# Patient Record
(demographics unavailable — no encounter records)

---

## 2017-03-21 NOTE — RAD
2 VIEWS OF CHEST:

 

Date:

03/20/17 

 

COMPARISON:  

08/11/16. 

 

HISTORY:  

Atrial fibrillation for 6 months. 

 

FINDINGS:

Single view of the chest shows an enlarged but stable cardiomediastinal silhouette. There is no evid
ence of consolidation or pleural effusion. Calcified granulomas are seen in the left thorax. 

 

IMPRESSION: 

Cardiomegaly without evidence of acute cardiopulmonary disease. 

 

 

POS: [facility]

## 2018-07-06 NOTE — EKG
Test Reason : 

Blood Pressure : ***/*** mmHG

Vent. Rate : 057 BPM     Atrial Rate : 057 BPM

   P-R Int : 212 ms          QRS Dur : 130 ms

    QT Int : 480 ms       P-R-T Axes : 029 -35 034 degrees

   QTc Int : 467 ms

 

Sinus bradycardia with 1st degree A-V block

Left axis deviation

Right bundle branch block

Inferior infarct (cited on or before 02-MAY-2017)

Abnormal ECG

When compared with ECG of 02-MAY-2017 08:58,

Right bundle branch block is now Present

Confirmed by DR. LEYLA BAPTISTE (3) on 7/6/2018 5:08:09 PM

 

Referred By:  PAAVN           Confirmed By:DR. LEYLA BAPTISTE

## 2018-07-12 NOTE — HP
HISTORY OF PRESENT ILLNESS:  Patient is a 72-year-old white male with a long history of progressive d
egenerative arthritis of the right hip, unresponsive to conservative treatment including rest, restri
ction of activities, lifestyle adjustments, and previous cortisone injections.  He is admitted at [facility]
s time for total knee replacement.  He has had previous left total knee replacement in 2012 with good
 results.

 

PAST MEDICAL HISTORY:  As noted above.  The patient also has diabetes, hypertension, atherosclerotic 
cardiovascular disease and atrial fibrillation on Xarelto.  He has stopped Xarelto 3 days prior to [facility].

 

CURRENT MEDICATIONS:  His other medications include aspirin which he has continued, Lasix, gabapentin
, lisinopril, Lantus insulin, metformin, Crestor, ____.

 

ALLERGIES:  He is allergic to TRADJENTA, INVOKANA and ACTOS.

 

FAMILY HISTORY:  Otherwise essentially unremarkable.

 

SOCIAL HISTORY:  Otherwise essentially unremarkable.

 

REVIEW OF SYSTEMS:  Otherwise essentially unremarkable.

 

PHYSICAL EXAMINATION:

GENERAL:  Reveals a healthy heavyset male.

HEENT:  Unremarkable.

NECK:  Supple.

CHEST:  Clear.

HEART:  Regular rate and rhythm.

ABDOMEN:  Soft, nontender.

RECTAL/GENITAL:  Deferred.

EXTREMITIES:  ____ the right knee, there is slight puffiness.  There is mild varus.  There is tendern
ess over the medial and lateral joint lines.  Range of motion is 5-105 degrees with crepitus.  There 
is no instability.  There are palpable distal pulses, right antalgic gait.  Neurovascular exam is int
act.

 

X-RAY FINDINGS:  X-rays of the right knee reveal tricompartmental DJD with narrowing medially and lat
erally.

 

IMPRESSION:

1.  Degenerative arthritis, right knee.

2.  Status post left total knee replacement.

3.  Adult-onset diabetes.

4.  Atherosclerotic cardiovascular disease.

5.  Hypertension.

 

PLAN:  Right total knee replacement.  The nature of the surgery, length of recovery, and potential co
mplications such as infection, loss of motion, incomplete relief, thromboembolic phenomena, neurovasc
ular injury, possible transfusion, and need for revision have been discussed in detail with the patie
nt and his wife.

## 2018-07-16 NOTE — OP
DATE OF PROCEDURE:  07/16/2018

 

SURGEON:  Vaughn Pepe M.D.

 

ASSISTANT:  CARLOS Wagoner.

 

ANESTHESIA:  General plus femoral site nerve blocks.

 

PREOPERATIVE DIAGNOSIS:  Degenerative arthritis, right knee.

 

POSTOPERATIVE DIAGNOSIS:  Degenerative arthritis, right knee.

 

PROCEDURES:  Right total knee replacement with cemented Triathlon components with computer-assisted n
avigation (#6 femoral component, #6 universal tibial baseplate with 11 mm CS plastic insert, and A35 
all plastic patellar component).

 

NARRATIVE REPORT:  After satisfactory anesthesia was induced in supine position, sequential compressi
on device was placed on the non-operative leg throughout the procedure.  Right leg was then prepped a
nd draped in routine sterile fashion.  Right leg was elevated, exsanguinated with an Esmarch bandage,
 and the tourniquet inflated to 300 mmHg.  A gently curved medial parapatellar incision was made and 
carried down to subcutaneous tissues, and bleeding points controlled with Bovie cautery.  Medial para
patellar arthrotomy was performed.  Patella was dislocated laterally and portions of fat pad excised 
for exposure.  There was marked tricompartmental degenerative arthritis of the knee.  Meniscal remnan
ts and osteophytes were removed.  Using the Boxcar pinless navigation system and the appropriate gabe
dileep, the distal femoral and proximal tibial articular surfaces were excised with an oscillating saw t
o accept the trial components.  It was felt that #6 femoral component, #6 universal tibial baseplate 
with 11 mm CS plastic insert gave appropriate size, fit, stability, and correction of the preoperativ
e deformity.  The patellar articular surface was excised to accept an all plastic A35 patellar compon
ent.  The trial components removed.  The knee was copiously irrigated with pulsatile lavage and the b
qamar surfaces thoroughly cleaned and dried.  The permanent components were then cemented in a single s
tage using 1 package of cement premixed with 1 gram of tobramycin powder.  Excess cement was removed.
  There was good fit and stability of the component.  The skin was infiltrated with 30 mL of 0.25% Ma
rcaine with epinephrine.  The medial retinaculum and quadriceps mechanism was closed with interrupted
 #2 Vicryl and a running #2 Quill.  Subcutaneous tissues were closed with running 0 Quill suture and 
the skin closed with running subcuticular 3-0 Monoderm and SurgiSeal skin adhesive.  A sterile bulky 
compressive dressing was applied and the tourniquet deflated after 87 minutes.  The foot promptly pin
ked up and sequential compression device was applied to his operated leg.  He was awakened and taken 
to recovery room in stable condition.  There were no apparent intraoperative complications.  The sri
mated blood loss was less than 100 mL.

## 2018-07-16 NOTE — PDOC.PN
- Subjective


Encounter Start Date: 07/16/18


Encounter Start Time: 14:30





Patient seen and examined for med mngt. No CP/SOB. Follows Dr Jackson. 





- Objective


MAR Reviewed: Yes


Vital Signs & Weight: 


 Vital Signs (12 hours)











  Temp Pulse Resp BP BP Pulse Ox


 


 07/16/18 16:00  97.7 F  60  20   120/67  90 L


 


 07/16/18 12:10     131/69  


 


 07/16/18 10:55  96.1 F L  56 L  18   134/73  95








 Weight











Weight                         325 lb














Additional Labs: 


 Accuchecks











  07/16/18 07/16/18 07/16/18





  15:55 12:18 06:29


 


POC Glucose  315 H  199 H  144 H











Radiology Reviewed by me: Yes (Knee XR - DJD)


EKG Reviewed by me: Yes (SB with 1 st deg AV block)





Phys Exam





- Physical Examination


Constitutional: NAD


Respiratory: no wheezing, no rhonchi


Cardiovascular: RRR, no rub


Gastrointestinal: soft, non-tender, positive bowel sounds


Musculoskeletal: no edema


Neurological: moves all 4 limbs





Dx/Plan





- Plan


DVT proph w/SCDs





IMPRESSION:


1. DM2


2. HTN


3. Par Afib on anticoag


4. Morbid obesity BMI 44.1





PLAN:


Xarelto to be restarted in AM at 10 mg daily


Cont Amiodarone


Change Lantus to 25 units BID


Add low dose sliding scale


AM labs


Cont Lisinopril


Cont current meds as below


Full code, DPOA - spouse








Review of Systems





- Review of Systems


Respiratory: negative: Cough, Dry, Shortness of Breath, Hemoptysis, SOB with 

Excertion, Pleuritic Pain, Sputum, Wheezing


Cardiovascular: negative: chest pain, palpitations, orthopnea, paroxysmal 

nocturnal dyspnea, edema, light headedness, other





- Medications/Allergies


Allergies/Adverse Reactions: 


 Allergies











Allergy/AdvReac Type Severity Reaction Status Date / Time


 


No Known Allergies Allergy   Verified 07/06/18 08:45











Medications: 


 Current Medications





Acetaminophen (Tylenol)  650 mg PO Q4H PRN


   PRN Reason: HA/ T > 101F; Mild Pain (1-3)


Hydrocodone Bitart/Acetaminophen (Norco 7.5/325)  1 tab PO Q4H PRN


   PRN Reason: Mild Pain (1-3)


Hydrocodone Bitart/Acetaminophen (Norco 7.5/325)  2 tab PO Q4H PRN


   PRN Reason: Moderate Pain (4-6)


   Last Admin: 07/16/18 17:11 Dose:  2 tab


Amiodarone HCl (Cordarone)  200 mg PO QAM [facility]


Aspirin (Aspirin Chewable)  81 mg PO QAM [facility]


Cefazolin Sodium (Ancef)  2 gm SLOW IVP Q8HR [facility]


   Stop: 07/16/18 22:01


   Last Admin: 07/16/18 17:11 Dose:  2 gm


Cholecalciferol (Vitamin D3)  2,000 units PO QAM [facility]


Dextrose/Water (Dextrose 50%)  25 gm SLOW IVP PRN PRN


   PRN Reason: Hypoglycemia


Diphenhydramine HCl (Benadryl)  25 mg PO Q6H PRN


   PRN Reason: Itching


Fentanyl (Sublimaze)  50 mcg SLOW IVP Q30MIN PRN


   PRN Reason: Moderate Pain (4-6)


   Last Admin: 07/16/18 19:24 Dose:  50 mcg


Fentanyl (Sublimaze)  100 mcg SLOW IVP Q1H PRN


   PRN Reason: Severe Pain (7-10)


Ferrous Gluconate (Fergon)  324 mg PO BID [facility]


Gabapentin (Neurontin)  300 mg PO BID [facility]


Glucagon (Glucagon)  1 mg IM PRN PRN


   PRN Reason: Hypoglycemia


Bupivacaine HCl 50 ml/ Sodium (Chloride)  100 mls @ 8 mls/hr NERVE BLCK INF [facility]


Sodium Chloride (Normal Saline 0.9%)  1,000 mls @ 100 mls/hr IV .Q10H [facility]


   Last Admin: 07/16/18 11:49 Dose:  Not Given


Vancomycin HCl 2 gm/ Sodium (Chloride)  500 mls @ 250 mls/hr IVPB 1800 [facility]


   Stop: 07/16/18 23:59


   Last Admin: 07/16/18 18:00 Dose:  500 mls


Insulin Glargine 25 units/ (Miscellaneous Medication)  0.25 mls @ 0 mls/hr SC 

BID [facility]


Dextrose/Water (D5w)  1,000 mls @ 0 mls/hr IV .Q0M PRN; As Directed


   PRN Reason: Hypoglycemia


Insulin Human Regular (Humulin R)  0 units SC .MILD SLIDING SCALE PRN


   PRN Reason: Mild Correctional Scale


Insulin Human Regular (Humulin R)  0 units SC .BEDTIME SLIDING SC PRN


   PRN Reason: Bedtime Correctional Scale


Iron/Minerals/Multivitamins (Theragran M)  1 tab PO DAILY [facility]


Ketorolac Tromethamine (Toradol)  15 mg IVP Q6H PRN


   PRN Reason: Moderate Pain (4-6)


   Stop: 07/19/18 09:47


   Last Admin: 07/16/18 19:21 Dose:  15 mg


Lisinopril (Zestril)  20 mg PO QAM [facility]


Metformin HCl (Glucophage Xr)  1,000 mg PO BID [facility]


Ondansetron HCl (Zofran)  4 mg IVP Q6H PRN


   PRN Reason: Nausea/Vomiting


Oxcarbazepine (Trileptal)  300 mg PO BID [facility]


Promethazine HCl (Phenergan)  12.5 mg IM Q4H PRN


   PRN Reason: Nausea


Promethazine HCl (Phenergan)  12.5 mg SLOW IVP Q4H PRN


   PRN Reason: Nausea/Vomiting


Rivaroxaban (Xarelto)  10 mg PO DAILY [facility]


Rosuvastatin Calcium (Crestor)  10 mg PO HS [facility]


Senna/Docusate Sodium (Senokot S)  2 tab PO BID [facility]


Sodium Chloride (Flush - Normal Saline)  10 ml IVF PRN PRN


   PRN Reason: Saline Flush


Tramadol HCl (Ultram)  50 mg PO Q6H PRN


   PRN Reason: Mild Pain (1-3)


Tramadol HCl (Ultram)  100 mg PO Q6H PRN


   PRN Reason: Mild Pain (1-3)


Zolpidem Tartrate (Ambien)  5 mg PO HSPRN PRN


   PRN Reason: Insomnia

## 2018-07-16 NOTE — RAD
TWO VIEWS RIGHT KNEE:

 

DATE: 7/16/18.

 

HISTORY: 

Evaluate knee following total knee arthroplasty.

 

FINDINGS: 

There is evidence of recent total knee arthroplasty, with postoperative gas in the region of the knee
 joint, particularly superiorly.  No evidence for hardware failure.  No displaced fracture or disloca
tion.  Lateral imaging demonstrates gas and fluid within the suprapatellar bursa.

 

IMPRESSION: 

Postoperative changes as described above, evidence of recent total knee arthroplasty.

 

POS: [facility]

## 2018-07-17 NOTE — PDOC.PN
- Subjective


Encounter Start Date: 07/17/18


Encounter Start Time: 13:30





Patient seen and examined for med mngt. Pain controlled. No CP/SOB. No new 

complaints. No overnight events





- Objective


MAR Reviewed: Yes


Vital Signs & Weight: 


 Vital Signs (12 hours)











  Temp Pulse Resp BP BP Pulse Ox


 


 07/17/18 15:31  98.4 F  72  18   147/73 H  95


 


 07/17/18 11:39  98.5 F  68  20   130/70  95


 


 07/17/18 08:24     133/65  


 


 07/17/18 08:20  98.3 F  73  20    91 L


 


 07/17/18 08:07  98.3 F  73  20   133/65  94 L








 Weight











Admit Weight                   325 lb


 


Weight                         325 lb














I&O: 


 











 07/16/18 07/17/18 07/18/18





 06:59 06:59 06:59


 


Intake Total  3143 


 


Output Total  1100 


 


Balance  2043 











Result Diagrams: 


 07/17/18 05:44





 07/17/18 07:16


Additional Labs: 


 Accuchecks











  07/17/18 07/17/18 07/17/18





  16:14 11:40 06:08


 


POC Glucose  192 H  178 H  169 H














  07/16/18





  21:02


 


POC Glucose  200 H














Phys Exam





- Physical Examination


Constitutional: NAD


Respiratory: no wheezing, no rhonchi


Cardiovascular: RRR, no rub


Gastrointestinal: soft, non-tender, positive bowel sounds


Musculoskeletal: no edema


Neurological: moves all 4 limbs





Dx/Plan





- Plan


plan discussed w/ family, PT/OT, , incentive spirometry, out of 

bed/ambulate, DVT proph w/SCDs





IMPRESSION:


1. DM2 - on sliding scale


2. HTN


3. Par Afib on anticoag


4. Morbid obesity BMI 44.1


5. Hypomagnesemia 1.5


6. RANDI on CPAP





PLAN:


Cont Amiodarone/Xarelto and Lisinopril


Cont Lantus to 25 units BID with low dose sliding scale


Cont other meds as below


Cont to monitor


PT/OT


Add Miralax


Cont CPAP HS








Review of Systems





- Review of Systems


Respiratory: negative: Cough, Dry, Shortness of Breath, Hemoptysis, SOB with 

Excertion, Pleuritic Pain, Sputum, Wheezing


Cardiovascular: negative: chest pain, palpitations, orthopnea, paroxysmal 

nocturnal dyspnea, edema, light headedness, other


Gastrointestinal: Constipation.  negative: Nausea, Vomiting, Abdominal Pain, 

Diarrhea, Melena, Hematochezia, Other





- Medications/Allergies


Allergies/Adverse Reactions: 


 Allergies











Allergy/AdvReac Type Severity Reaction Status Date / Time


 


No Known Allergies Allergy   Verified 07/06/18 08:45











Medications: 


 Current Medications





Acetaminophen (Tylenol)  650 mg PO Q4H PRN


   PRN Reason: HA/ T > 101F; Mild Pain (1-3)


Hydrocodone Bitart/Acetaminophen (Norco 7.5/325)  1 tab PO Q4H PRN


   PRN Reason: Mild Pain (1-3)


Hydrocodone Bitart/Acetaminophen (Norco 7.5/325)  2 tab PO Q4H PRN


   PRN Reason: Moderate Pain (4-6)


   Last Admin: 07/17/18 15:08 Dose:  2 tab


Amiodarone HCl (Cordarone)  100 mg PO BID Counts include 234 beds at the [facility]


   Last Admin: 07/17/18 08:25 Dose:  100 mg


Aspirin (Aspirin Chewable)  81 mg PO [facility]


   Last Admin: 07/17/18 08:25 Dose:  81 mg


Cholecalciferol (Vitamin D3)  2,000 units PO [facility]


   Last Admin: 07/17/18 08:26 Dose:  2,000 units


Dextrose/Water (Dextrose 50%)  25 gm SLOW IVP PRN PRN


   PRN Reason: Hypoglycemia


Diphenhydramine HCl (Benadryl)  25 mg PO Q6H PRN


   PRN Reason: Itching


Fentanyl (Sublimaze)  50 mcg SLOW IVP Q30MIN PRN


   PRN Reason: Moderate Pain (4-6)


   Last Admin: 07/16/18 19:24 Dose:  50 mcg


Fentanyl (Sublimaze)  100 mcg SLOW IVP Q1H PRN


   PRN Reason: Severe Pain (7-10)


Ferrous Gluconate (Fergon)  324 mg PO BID Counts include 234 beds at the [facility]


   Last Admin: 07/17/18 08:25 Dose:  324 mg


Gabapentin (Neurontin)  300 mg PO BID Counts include 234 beds at the [facility]


   Last Admin: 07/17/18 08:26 Dose:  300 mg


Glucagon (Glucagon)  1 mg IM PRN PRN


   PRN Reason: Hypoglycemia


Bupivacaine HCl 50 ml/ Sodium (Chloride)  100 mls @ 8 mls/hr NERVE BLCK INF Counts include 234 beds at the [facility]


   Last Admin: 07/17/18 12:57 Dose:  100 mls


Sodium Chloride (Normal Saline 0.9%)  1,000 mls @ 100 mls/hr IV .Q10H Counts include 234 beds at the [facility]


   Last Admin: 07/17/18 19:37 Dose:  Not Given


Insulin Glargine 25 units/ (Miscellaneous Medication)  0.25 mls @ 0 mls/hr SC 

BID Counts include 234 beds at the [facility]


   Last Admin: 07/17/18 09:12 Dose:  0.25 mls


Dextrose/Water (D5w)  1,000 mls @ 0 mls/hr IV .Q0M PRN; As Directed


   PRN Reason: Hypoglycemia


Insulin Human Regular (Humulin R)  0 units SC .MILD SLIDING SCALE PRN


   PRN Reason: Mild Correctional Scale


Insulin Human Regular (Humulin R)  0 units SC .BEDTIME SLIDING SC PRN


   PRN Reason: Bedtime Correctional Scale


Iron/Minerals/Multivitamins (Theragran M)  1 tab PO DAILY Counts include 234 beds at the [facility]


   Last Admin: 07/17/18 08:25 Dose:  1 tab


Ketorolac Tromethamine (Toradol)  15 mg IVP Q6H PRN


   PRN Reason: Moderate Pain (4-6)


   Stop: 07/19/18 09:47


   Last Admin: 07/17/18 17:00 Dose:  15 mg


Lisinopril (Zestril)  20 mg PO QAM Counts include 234 beds at the [facility]


   Last Admin: 07/17/18 08:24 Dose:  20 mg


Metformin HCl (Glucophage Xr)  1,000 mg PO BID Counts include 234 beds at the [facility]


   Last Admin: 07/17/18 08:25 Dose:  1,000 mg


Ondansetron HCl (Zofran)  4 mg IVP Q6H PRN


   PRN Reason: Nausea/Vomiting


Oxcarbazepine (Trileptal)  300 mg PO BID Counts include 234 beds at the [facility]


   Last Admin: 07/17/18 09:12 Dose:  300 mg


Promethazine HCl (Phenergan)  12.5 mg IM Q4H PRN


   PRN Reason: Nausea


Promethazine HCl (Phenergan)  12.5 mg SLOW IVP Q4H PRN


   PRN Reason: Nausea/Vomiting


Rivaroxaban (Xarelto)  10 mg PO DAILY Counts include 234 beds at the [facility]


   Last Admin: 07/17/18 08:24 Dose:  10 mg


Rosuvastatin Calcium (Crestor)  10 mg PO QAM Counts include 234 beds at the [facility]


   Last Admin: 07/17/18 08:25 Dose:  10 mg


Senna/Docusate Sodium (Senokot S)  2 tab PO BID Counts include 234 beds at the [facility]


   Last Admin: 07/17/18 08:25 Dose:  2 tab


Sodium Chloride (Flush - Normal Saline)  10 ml IVF PRN PRN


   PRN Reason: Saline Flush


Tramadol HCl (Ultram)  50 mg PO Q6H PRN


   PRN Reason: Mild Pain (1-3)


Tramadol HCl (Ultram)  100 mg PO Q6H PRN


   PRN Reason: Mild Pain (1-3)


Zolpidem Tartrate (Ambien)  5 mg PO HSPRN PRN


   PRN Reason: Insomnia

## 2018-07-18 NOTE — PDOC.PN
- Subjective


Encounter Start Date: 07/18/18


Encounter Start Time: 18:30





Patient seen and examined for med mngt.  Some discomfort over the surgical 

site. No CP/SOB. No overnight events





- Objective


MAR Reviewed: Yes


Vital Signs & Weight: 


 Vital Signs (12 hours)











  Temp Pulse Resp BP Pulse Ox


 


 07/18/18 20:38  99.3 F  83  17  151/65 H  96


 


 07/18/18 17:12   88   


 


 07/18/18 16:30      94 L


 


 07/18/18 16:04  98.4 F  88  20  171/71 H  91 L


 


 07/18/18 11:43  98.4 F  70  20  149/71 H  92 L








 Weight











Admit Weight                   325 lb


 


Weight                         325 lb














I&O: 


 











 07/17/18 07/18/18 07/19/18





 06:59 06:59 06:59


 


Intake Total 3143 2065 500


 


Output Total 1100 825 


 


Balance 2043 1240 500











Result Diagrams: 


 07/19/18 05:08





 07/19/18 05:08


Additional Labs: 


 Accuchecks











  07/18/18 07/18/18 07/18/18





  20:53 16:07 11:14


 


POC Glucose  241 H  229 H  176 H














  07/18/18





  05:29


 


POC Glucose  204 H














Phys Exam





- Physical Examination


Constitutional: NAD


Respiratory: no wheezing, no rhonchi


Cardiovascular: RRR, no rub


Gastrointestinal: soft, non-tender, positive bowel sounds


Musculoskeletal: no edema


Neurological: moves all 4 limbs





Dx/Plan





- Plan


DVT proph w/SCDs





IMPRESSION:


1. DM2 - on sliding scale


2. HTN - uncontrolled


3. Par Afib on anticoag


4. Morbid obesity BMI 44.1


5. Hypomagnesemia 1.5 - replaced


6. RANDI on CPAP





PLAN:


One dose Amlodipine 2.5 mg


Cont Amiodarone/Xarelto and Lisinopril 20 mg daily


Cont Lantus with low dose sliding scale


Cont other meds as below


Cont to monitor


Cont CPAP HS








Review of Systems





- Review of Systems


Cardiovascular: negative: chest pain, palpitations, orthopnea, paroxysmal 

nocturnal dyspnea, edema, light headedness, other


Gastrointestinal: negative: Nausea, Vomiting, Abdominal Pain, Diarrhea, 

Constipation, Melena, Hematochezia, Other





- Medications/Allergies


Allergies/Adverse Reactions: 


 Allergies











Allergy/AdvReac Type Severity Reaction Status Date / Time


 


No Known Allergies Allergy   Verified 07/06/18 08:45











Medications: 


 Current Medications





Acetaminophen (Tylenol)  650 mg PO Q4H PRN


   PRN Reason: HA/ T > 101F; Mild Pain (1-3)


   Last Admin: 07/18/18 04:02 Dose:  650 mg


Hydrocodone Bitart/Acetaminophen (Norco 7.5/325)  1 tab PO Q4H PRN


   PRN Reason: Mild Pain (1-3)


Hydrocodone Bitart/Acetaminophen (Norco 7.5/325)  2 tab PO Q4H PRN


   PRN Reason: Moderate Pain (4-6)


   Last Admin: 07/18/18 16:05 Dose:  2 tab


Amiodarone HCl (Cordarone)  100 mg PO BID [facility]


   Last Admin: 07/18/18 09:01 Dose:  100 mg


Aspirin (Aspirin Chewable)  81 mg PO [facility]


   Last Admin: 07/18/18 09:01 Dose:  81 mg


Cholecalciferol (Vitamin D3)  2,000 units PO [facility]


   Last Admin: 07/18/18 09:00 Dose:  2,000 units


Dextrose/Water (Dextrose 50%)  25 gm SLOW IVP PRN PRN


   PRN Reason: Hypoglycemia


Diphenhydramine HCl (Benadryl)  25 mg PO Q6H PRN


   PRN Reason: Itching


Fentanyl (Sublimaze)  50 mcg SLOW IVP Q30MIN PRN


   PRN Reason: Moderate Pain (4-6)


   Last Admin: 07/16/18 19:24 Dose:  50 mcg


Fentanyl (Sublimaze)  100 mcg SLOW IVP Q1H PRN


   PRN Reason: Severe Pain (7-10)


Ferrous Gluconate (Fergon)  324 mg PO BID [facility]


   Last Admin: 07/18/18 09:01 Dose:  324 mg


Gabapentin (Neurontin)  300 mg PO BID [facility]


   Last Admin: 07/18/18 09:01 Dose:  300 mg


Glucagon (Glucagon)  1 mg IM PRN PRN


   PRN Reason: Hypoglycemia


Hydralazine HCl (Apresoline)  10 mg SLOW IVP Q4H PRN


   PRN Reason: SBP Greater Than 180


Bupivacaine HCl 50 ml/ Sodium (Chloride)  100 mls @ 8 mls/hr NERVE BLCK INF [facility]


   Last Admin: 07/18/18 00:43 Dose:  100 mls


Insulin Glargine 25 units/ (Miscellaneous Medication)  0.25 mls @ 0 mls/hr SC 

BID [facility]


   Last Admin: 07/18/18 08:59 Dose:  0.25 mls


Dextrose/Water (D5w)  1,000 mls @ 0 mls/hr IV .Q0M PRN; As Directed


   PRN Reason: Hypoglycemia


Insulin Human Regular (Humulin R)  0 units SC .BEDTIME SLIDING SC PRN


   PRN Reason: Bedtime Correctional Scale


   Last Admin: 07/17/18 20:51 Dose:  2 unit


Insulin Human Regular (Humulin R)  0 units SC .MODERATE SLIDING SC PRN


   PRN Reason: Moderate Correctional Scale


   Last Admin: 07/18/18 17:13 Dose:  4 units


Iron/Minerals/Multivitamins (Theragran M)  1 tab PO DAILY [facility]


   Last Admin: 07/18/18 09:00 Dose:  1 tab


Ketorolac Tromethamine (Toradol)  15 mg IVP Q6H PRN


   PRN Reason: Moderate Pain (4-6)


   Stop: 07/19/18 09:47


   Last Admin: 07/17/18 17:00 Dose:  15 mg


Lisinopril (Zestril)  20 mg PO QAM [facility]


   Last Admin: 07/18/18 09:02 Dose:  20 mg


Metformin HCl (Glucophage Xr)  1,000 mg PO BID [facility]


   Last Admin: 07/18/18 08:59 Dose:  1,000 mg


Ondansetron HCl (Zofran)  4 mg IVP Q6H PRN


   PRN Reason: Nausea/Vomiting


Oxcarbazepine (Trileptal)  300 mg PO BID [facility]


   Last Admin: 07/18/18 09:01 Dose:  300 mg


Promethazine HCl (Phenergan)  12.5 mg IM Q4H PRN


   PRN Reason: Nausea


Promethazine HCl (Phenergan)  12.5 mg SLOW IVP Q4H PRN


   PRN Reason: Nausea/Vomiting


Rivaroxaban (Xarelto)  10 mg PO DAILY [facility]


   Last Admin: 07/18/18 09:02 Dose:  10 mg


Rosuvastatin Calcium (Crestor)  10 mg PO QAM [facility]


   Last Admin: 07/18/18 09:01 Dose:  10 mg


Senna/Docusate Sodium (Senokot S)  2 tab PO BID [facility]


   Last Admin: 07/18/18 08:59 Dose:  2 tab


Sodium Chloride (Flush - Normal Saline)  10 ml IVF PRN PRN


   PRN Reason: Saline Flush


Tramadol HCl (Ultram)  50 mg PO Q6H PRN


   PRN Reason: Mild Pain (1-3)


Tramadol HCl (Ultram)  100 mg PO Q6H PRN


   PRN Reason: Mild Pain (1-3)


Zolpidem Tartrate (Ambien)  5 mg PO HSPRN PRN


   PRN Reason: Insomnia

## 2018-10-02 NOTE — HP
DATE OF ADMISSION:  10/03/2018

 

HISTORY OF PRESENT ILLNESS:  The patient is a 72-year-old white male with a 6 month history of pain a
nd tingling in his right hand, especially his middle and index fingers for the past six months.  He h
as had no injury.  He has had persistent symptoms despite rest, restriction of activities, and splint
ing.  His symptoms may have become somewhat worse after using a walker following right total knee rep
lacement in July of this year.

 

PAST MEDICAL HISTORY:  Please see the old chart.  The patient has history of diabetes, hypertension, 
atrial fibrillation, bilateral total knee replacements and previous back surgery.

 

CURRENT MEDICATIONS:  Include aspirin, insulin, gabapentin, lisinopril, Xarelto, which he has stopped
 3 days prior to anticipate surgery, Oxcarbazepine, Pacerone, Lasix, metformin.

 

ALLERGIES:  He is allergic to TRADJENTA, INVOKANA, and ACTOS.

 

FAMILY HISTORY, SOCIAL HISTORY, REVIEW OF SYSTEMS:  Otherwise unremarkable.  Please see the old chart
.

 

PHYSICAL EXAMINATION:

GENERAL:  Reveals a healthy heavyset male.

HEENT:  Unremarkable.

NECK:  Supple.

CHEST:  Clear.

HEART:  Regular rate and rhythm.

ABDOMEN:  Soft, nontender.

RECTAL/GENITAL:  Deferred.

EXTREMITIES:  Pertinent findings of the right wrist.  There is some questionable right thenar atrophy
.  There is no point tenderness.  There is full range of motion.  There is no crepitus.  Motor exam i
s intact.  There is decreased sensation in median nerve distribution.  There is a negative Tinel sign
, negative Phalen's test.  Pulses are 2+.

 

IMAGING:  Electrodiagnostic studies reveal severe right carpal tunnel syndrome and mild left carpal t
unnel syndrome.

 

IMPRESSION:

1.  Right carpal tunnel syndrome.

2.  Diabetes.

3.  History of atrial fibrillation.

4.  Status post bilateral total knee replacements.

5.  History of hypertension.

 

PLAN:  Endoscopic possible open right carpal tunnel release.  The nature of the surgery, length of re
covery, and potential complications such as infection, loss of motion, incomplete relief, nerve injur
y, recurrence, and need for additional treatment or repeat surgery have been discussed in detail.

## 2018-10-03 NOTE — OP
DATE OF PROCEDURE:  10/03/2018

 

SURGEON:  Vaughn Pepe M.D.

 

ANESTHESIA:  General.

 

PREOPERATIVE DIAGNOSIS:  Right carpal tunnel syndrome.

 

POSTOPERATIVE DIAGNOSIS:  Right carpal tunnel syndrome.

 

PROCEDURE:  Right endoscopic carpal tunnel release.

 

NARRATIVE REPORT:  After satisfactory anesthesia was induced in supine position, the patient was prep
ped and draped in routine manner.  Right arm was elevated, exsanguinated with an Esmarch bandage, and
 the tourniquet inflated to 200 mmHg.  A 2 cm transverse incision was made in the proximal wrist flex
ion crease, carried down to subcutaneous tissues.  Bleeding points controlled with cautery.  Using sh
obie and blunt dissection, a distally based flap a deep forearm fascia was developed and retracted dis
tally.  The proximal edge of the deep forearm fascia was split under direct visualization with small 
scissors to make sure there was no proximal impingement of the median nerve.  Synovium elevator was i
ntroduced beneath the transverse carpal ligament and synovium cleaned from the undersurface.  Carpal 
tunnel dilators were inserted.  The 3M Hellen endoscopic carpal tunnel system was introduced beneath th
e transverse carpal ligament in line with the ring finger.  The distal edge of the ligament was easil
y identified and then divided in a distal to proximal direction by pulling the trigger of the assembl
y, engaging the knife, and withdrawing the scope proximally.  This is done in several stages to make 
sure there was complete division of the transverse carpal ligament which was documented with the vide
o printer.  After withdrawing the scope, a carpal tunnel dilator could be inserted into the carpal tu
nnel.  There was markedly improved passage and subcutaneous position of the instrument.  The scope wa
s reintroduced into the carpal tunnel.  There was wide separation of the two leaves of the transverse
 carpal ligament.  The tourniquet was released after 7 minutes.  There was no excessive bleeding.  Th
e scope was withdrawn.  The wound was then thoroughly irrigated and closed with running subcuticular 
3-0 nylon.  Sterile dressing was applied.  The patient immobilized in a Velcro wrist splint.  He was 
awakened and taken to recovery in stable condition.  There were no apparent intraoperative complicati
ons.  The estimated blood loss was negligible.

 

The patient will be discharged home in satisfactory condition.  He was instructed on ice, elevation, 
and given written wound care instructions.  He was given a prescription for Norco 5 for pain, 24 tabl
ets.  He will recheck in my office in approximately 2 weeks or sooner if there any problems prior to 
that time.